# Patient Record
Sex: MALE | Race: WHITE | NOT HISPANIC OR LATINO | URBAN - METROPOLITAN AREA
[De-identification: names, ages, dates, MRNs, and addresses within clinical notes are randomized per-mention and may not be internally consistent; named-entity substitution may affect disease eponyms.]

---

## 2019-12-21 ENCOUNTER — EMERGENCY (EMERGENCY)
Facility: HOSPITAL | Age: 34
LOS: 1 days | Discharge: HOME | End: 2019-12-21
Attending: INTERNAL MEDICINE | Admitting: INTERNAL MEDICINE
Payer: MEDICAID

## 2019-12-21 VITALS
HEIGHT: 71 IN | DIASTOLIC BLOOD PRESSURE: 96 MMHG | HEART RATE: 96 BPM | TEMPERATURE: 98 F | OXYGEN SATURATION: 98 % | WEIGHT: 160.06 LBS | SYSTOLIC BLOOD PRESSURE: 149 MMHG | RESPIRATION RATE: 20 BRPM

## 2019-12-21 LAB
APPEARANCE UR: CLEAR — SIGNIFICANT CHANGE UP
BILIRUB UR-MCNC: NEGATIVE — SIGNIFICANT CHANGE UP
COLOR SPEC: YELLOW — SIGNIFICANT CHANGE UP
DIFF PNL FLD: ABNORMAL
GLUCOSE UR QL: NEGATIVE MG/DL — SIGNIFICANT CHANGE UP
KETONES UR-MCNC: ABNORMAL
LEUKOCYTE ESTERASE UR-ACNC: ABNORMAL
NITRITE UR-MCNC: NEGATIVE — SIGNIFICANT CHANGE UP
PH UR: 7 — SIGNIFICANT CHANGE UP (ref 5–8)
PROT UR-MCNC: 15 MG/DL
RBC CASTS # UR COMP ASSIST: ABNORMAL /HPF (ref 0–4)
SP GR SPEC: 1.01 — SIGNIFICANT CHANGE UP (ref 1.01–1.02)
UROBILINOGEN FLD QL: 4 MG/DL
WBC UR QL: SIGNIFICANT CHANGE UP

## 2019-12-21 PROCEDURE — 71045 X-RAY EXAM CHEST 1 VIEW: CPT

## 2019-12-21 PROCEDURE — 74019 RADEX ABDOMEN 2 VIEWS: CPT | Mod: 26

## 2019-12-21 PROCEDURE — 99283 EMERGENCY DEPT VISIT LOW MDM: CPT

## 2019-12-21 PROCEDURE — 71045 X-RAY EXAM CHEST 1 VIEW: CPT | Mod: 26

## 2019-12-21 PROCEDURE — 99284 EMERGENCY DEPT VISIT MOD MDM: CPT | Mod: 25

## 2019-12-21 PROCEDURE — 81001 URINALYSIS AUTO W/SCOPE: CPT

## 2019-12-21 PROCEDURE — 74019 RADEX ABDOMEN 2 VIEWS: CPT

## 2019-12-21 NOTE — ED PROVIDER NOTE - SIGNIFICANT NEGATIVE FINDINGS
no headache, no chest pain, no Syncope , no palpitations, no n/v/d, no urinary symptoms, no GI  bleeding. no neuro changes.

## 2019-12-21 NOTE — ED PROVIDER NOTE - OBJECTIVE STATEMENT
33 y/o man who appears anxious and has a couple of complaints, he feels SOB, anxious and found to have an elevated BP, No chest pain, no cough, no fever, no chills, no acute stroke symptoms. Also feeling abdominal discomfort and believes that he has constipation, for which he has seen his PMD for in the past , no nausea, no vomiting, no urinary symptoms, no GIB.  He refusing lab analysis, BP medication but he will allow x ray and u/a as part of his evaluation. He drove himself but also declines a relaxant

## 2019-12-21 NOTE — ED ADULT NURSE NOTE - NSIMPLEMENTINTERV_GEN_ALL_ED
Implemented All Universal Safety Interventions:  [place] to call system. Call bell, personal items and telephone within reach. Instruct patient to call for assistance. Room bathroom lighting operational. Non-slip footwear when patient is off stretcher. Physically safe environment: no spills, clutter or unnecessary equipment. Stretcher in lowest position, wheels locked, appropriate side rails in place.

## 2019-12-21 NOTE — ED PROVIDER NOTE - PATIENT PORTAL LINK FT
You can access the FollowMyHealth Patient Portal offered by [facility] by registering at the following website: [email or web address]. By joining [facility]’s FollowMyHealth portal, you will also be able to view your health information using other applications (apps) compatible with our system.

## 2019-12-21 NOTE — ED PROVIDER NOTE - PROVIDER TOKENS
PROVIDER:[TOKEN:[[id number]],FOLLOWUP:[1-3 Days]],PROVIDER:[TOKEN:[[id number]],FOLLOWUP:[1-3 Days]]

## 2019-12-21 NOTE — ED PROVIDER NOTE - CARE PLAN
Principal Discharge DX:	Anxiety Principal Discharge DX:	Anxiety  Secondary Diagnosis:	HTN (hypertension)  Secondary Diagnosis:	Hematuria

## 2019-12-21 NOTE — ED PROVIDER NOTE - NSFOLLOWUPINSTRUCTIONS_ED_ALL_ED_FT
We found your blood pressure to be elevated , this can be due to anxiety. You need to have this checked, your were given the name of a cardiologist    we found blood in the urine this needs to be checked by your doctor or a urologist a referral was given

## 2019-12-21 NOTE — ED PROVIDER NOTE - CARE PROVIDER_API CALL
Nate Avendaño)  Cardiovascular Disease; Internal Medicine  [address] [place] [address]  Phone: [phone]  Fax: [phone]  Follow Up Time: 1-3 Days    Lionel Laureano)  Urology  [address], [address]  [place] [address]  Phone: [phone]  Fax: [phone]  Follow Up Time: 1-3 Days

## 2019-12-21 NOTE — ED PROVIDER NOTE - CLINICAL SUMMARY MEDICAL DECISION MAKING FREE TEXT BOX
patient is anxious, dyspnea and elevated BP, abdominal discomfort and h/o constipation , consents to general exam, no blood test   plan UA xrays and referral to urology and cardiology for BP MGT

## 2019-12-21 NOTE — ED PROVIDER NOTE - CARE PROVIDERS DIRECT ADDRESSES
[email or web address]

## 2020-01-01 ENCOUNTER — OUTPATIENT (OUTPATIENT)
Dept: OUTPATIENT SERVICES | Facility: HOSPITAL | Age: 35
LOS: 1 days | End: 2020-01-01
Payer: MEDICAID

## 2020-01-01 PROCEDURE — G9001: CPT

## 2020-01-03 DIAGNOSIS — Z71.89 OTHER SPECIFIED COUNSELING: ICD-10-CM

## 2020-02-06 ENCOUNTER — TRANSCRIPTION ENCOUNTER (OUTPATIENT)
Age: 35
End: 2020-02-06

## 2020-02-10 ENCOUNTER — OUTPATIENT (OUTPATIENT)
Dept: OUTPATIENT SERVICES | Facility: HOSPITAL | Age: 35
LOS: 1 days | Discharge: STILL ADMITTED OR ADMINISTRATIVE | End: 2020-02-10

## 2020-02-14 ENCOUNTER — TRANSCRIPTION ENCOUNTER (OUTPATIENT)
Age: 35
End: 2020-02-14

## 2023-03-08 PROBLEM — Z00.00 ENCOUNTER FOR PREVENTIVE HEALTH EXAMINATION: Status: ACTIVE | Noted: 2023-03-08

## 2023-03-13 ENCOUNTER — NON-APPOINTMENT (OUTPATIENT)
Age: 38
End: 2023-03-13

## 2023-03-13 ENCOUNTER — APPOINTMENT (OUTPATIENT)
Dept: COLORECTAL SURGERY | Facility: CLINIC | Age: 38
End: 2023-03-13
Payer: MEDICAID

## 2023-03-13 VITALS
WEIGHT: 245 LBS | TEMPERATURE: 98.7 F | BODY MASS INDEX: 34.3 KG/M2 | HEART RATE: 81 BPM | RESPIRATION RATE: 14 BRPM | HEIGHT: 71 IN | SYSTOLIC BLOOD PRESSURE: 116 MMHG | DIASTOLIC BLOOD PRESSURE: 78 MMHG

## 2023-03-13 PROCEDURE — 46221 LIGATION OF HEMORRHOID(S): CPT

## 2023-03-13 PROCEDURE — 99204 OFFICE O/P NEW MOD 45 MIN: CPT | Mod: 25

## 2023-03-13 NOTE — HISTORY OF PRESENT ILLNESS
[FreeTextEntry1] : Mr. De Paz presents to the office for consultation secondary to a history of intermittent rectal bleeding.  He notes that this has been evaluated in the past, approximately July 2020, with a flexible sigmoidoscopy.  He was reassured at that time that the bleeding is likely from a more concerning source.  At that time, he was also prescribed hemorrhoidal creams.  Since that time, he continues to note rectal bleeding at least once a week with bowel movements.  His stools are described as passed on a daily basis, without straining and of a soft consistency.  He has occasional hemorrhoidal burning and itching.  He denies worsening frequency of bleeding or amount of bleeding overall.  He did not pursue complete colonoscopy in 2020 secondary to concerns for COVID as well as some difficulties with having someone drive him to and from the facilities.  Here for further evaluation and treatment.

## 2023-03-13 NOTE — ASSESSMENT
[FreeTextEntry1] : Mr. De Paz presents to the office for consultation secondary to intermittent rectal bleeding.  He had a diagnostic flexible sigmoidoscopy approximately 3 years earlier for this rectal bleeding, and was advised that the source was likely hemorrhoidal in nature.  Since that time, he continues to have relatively similar frequency and volume of rectal bleeding.  BREANNA and anoscopy in office today revealed full grade 2 internal hemorrhoids.  As he has never had rubber band ligation in the past, we proceeded to perform this to his left lateral column at today's office visit.  He was advised on what to expect postprocedure and should follow-up in 2 to 3 weeks for additional ligations.  Should he continue to have ongoing rectal bleeding despite rubber band ligations, we would then proceed with diagnostic colonoscopy.  Patient understands, and is agreeable with plan of care.

## 2023-03-13 NOTE — PHYSICAL EXAM
[Normal rectal exam] : exam was normal [Reduce Spontaneously] : a spontaneously reducible (grade II) [Normal] : was normal [None] : there was no rectal mass  [No Rash or Lesion] : No rash or lesion [Alert] : alert [Oriented to Person] : oriented to person [Oriented to Place] : oriented to place [Oriented to Time] : oriented to time [Calm] : calm [Skin Tags] : there were no residual hemorrhoidal skin tags seen [Gross Blood] : no gross blood [de-identified] : full Grade 2 [de-identified] : No apparent distress [de-identified] : Normocephalic atraumatic [de-identified] : Moving all extremities x4

## 2023-03-28 ENCOUNTER — APPOINTMENT (OUTPATIENT)
Dept: UROLOGY | Facility: CLINIC | Age: 38
End: 2023-03-28

## 2023-04-03 ENCOUNTER — APPOINTMENT (OUTPATIENT)
Dept: COLORECTAL SURGERY | Facility: CLINIC | Age: 38
End: 2023-04-03
Payer: MEDICAID

## 2023-04-03 VITALS
RESPIRATION RATE: 14 BRPM | SYSTOLIC BLOOD PRESSURE: 121 MMHG | WEIGHT: 245 LBS | DIASTOLIC BLOOD PRESSURE: 74 MMHG | TEMPERATURE: 98 F | HEIGHT: 71 IN | HEART RATE: 71 BPM | BODY MASS INDEX: 34.3 KG/M2

## 2023-04-03 PROCEDURE — 46221 LIGATION OF HEMORRHOID(S): CPT

## 2023-04-03 PROCEDURE — 99214 OFFICE O/P EST MOD 30 MIN: CPT | Mod: 25

## 2023-04-03 NOTE — HISTORY OF PRESENT ILLNESS
[FreeTextEntry1] : Mr. De Paz presents to the office for consultation secondary to a history of intermittent rectal bleeding.  He notes that this has been evaluated in the past, approximately July 2020, with a flexible sigmoidoscopy.  He was reassured at that time that the bleeding is likely from a more concerning source.  At that time, he was also prescribed hemorrhoidal creams.  Since that time, he continues to note rectal bleeding at least once a week with bowel movements.  His stools are described as passed on a daily basis, without straining and of a soft consistency.  He has occasional hemorrhoidal burning and itching.  He denies worsening frequency of bleeding or amount of bleeding overall.  He did not pursue complete colonoscopy in 2020 secondary to concerns for COVID as well as some difficulties with having someone drive him to and from the facilities.  Here for further evaluation and treatment.\par \par 4/3/23 Here for followup of bleeding internal hemorrhoids.\par Overall, bleeding is improved, but persists to some degree. BMs continue to be passed without issue.

## 2023-04-03 NOTE — PHYSICAL EXAM
[Normal rectal exam] : exam was normal [Reduce Spontaneously] : a spontaneously reducible (grade II) [Normal] : was normal [None] : there was no rectal mass  [No Rash or Lesion] : No rash or lesion [Alert] : alert [Oriented to Person] : oriented to person [Oriented to Place] : oriented to place [Oriented to Time] : oriented to time [Calm] : calm [Skin Tags] : there were no residual hemorrhoidal skin tags seen [Gross Blood] : no gross blood [de-identified] : full Grade 2 [de-identified] : Normocephalic atraumatic [de-identified] : No apparent distress [de-identified] : Moving all extremities x4

## 2023-04-03 NOTE — ASSESSMENT
[FreeTextEntry1] : Mr. De Paz presents to the office for consultation secondary to intermittent rectal bleeding.  He had a diagnostic flexible sigmoidoscopy approximately 3 years earlier for this rectal bleeding, and was advised that the source was likely hemorrhoidal in nature.  Since that time, he continues to have relatively similar frequency and volume of rectal bleeding.  BREANNA and anoscopy in office today revealed full grade 2 internal hemorrhoids.  As he has never had rubber band ligation in the past, we proceeded to perform this to his left lateral column at today's office visit.  He was advised on what to expect postprocedure and should follow-up in 2 to 3 weeks for additional ligations.  Should he continue to have ongoing rectal bleeding despite rubber band ligations, we would then proceed with diagnostic colonoscopy.  Patient understands, and is agreeable with plan of care.\par \par 4/3/23 Mr. De Paz returns to the office for followup.  He underwent another rubber band ligation for ongoing, but improved rectal bleeding.  He was advised to follow-up in 3 weeks time for another ligation, and at that visit, we will likely schedule him for colonoscopy if bleeding does not improve.  Patient understands, and is agreeable.

## 2023-04-20 ENCOUNTER — APPOINTMENT (OUTPATIENT)
Dept: UROLOGY | Facility: CLINIC | Age: 38
End: 2023-04-20
Payer: MEDICAID

## 2023-04-20 VITALS
WEIGHT: 245 LBS | BODY MASS INDEX: 34.3 KG/M2 | OXYGEN SATURATION: 97 % | SYSTOLIC BLOOD PRESSURE: 111 MMHG | RESPIRATION RATE: 16 BRPM | HEART RATE: 65 BPM | DIASTOLIC BLOOD PRESSURE: 74 MMHG | HEIGHT: 71 IN

## 2023-04-20 DIAGNOSIS — R79.89 OTHER SPECIFIED ABNORMAL FINDINGS OF BLOOD CHEMISTRY: ICD-10-CM

## 2023-04-20 PROCEDURE — 99204 OFFICE O/P NEW MOD 45 MIN: CPT

## 2023-04-20 NOTE — PHYSICAL EXAM
[General Appearance - Well Developed] : well developed [General Appearance - Well Nourished] : well nourished [Normal Appearance] : normal appearance [Well Groomed] : well groomed [General Appearance - In No Acute Distress] : no acute distress [Edema] : no peripheral edema [Respiration, Rhythm And Depth] : normal respiratory rhythm and effort [Exaggerated Use Of Accessory Muscles For Inspiration] : no accessory muscle use [Abdomen Soft] : soft [Abdomen Tenderness] : non-tender [Costovertebral Angle Tenderness] : no ~M costovertebral angle tenderness [Urethral Meatus] : meatus normal [Urinary Bladder Findings] : the bladder was normal on palpation [Scrotum] : the scrotum was normal [Testes Mass (___cm)] : there were no testicular masses [No Prostate Nodules] : no prostate nodules [FreeTextEntry1] : Normal male genitalia. The prostate gland is small to moderate size and palpably benign.  [Normal Station and Gait] : the gait and station were normal for the patient's age [] : no rash [No Focal Deficits] : no focal deficits [Oriented To Time, Place, And Person] : oriented to person, place, and time [Affect] : the affect was normal [Mood] : the mood was normal [Not Anxious] : not anxious [No Palpable Adenopathy] : no palpable adenopathy

## 2023-04-20 NOTE — END OF VISIT
[FreeTextEntry3] : Serum studies were sent which include a PSA, FSH, LSH, prolactin, and T. A urine was sent as well. If his T is indeed low, he will be called for supplimentation and instructions in the future.

## 2023-04-20 NOTE — HISTORY OF PRESENT ILLNESS
[FreeTextEntry1] : 37M presents today with a CC of low T. He was found to have a T of 214. He states that he has had sonograph of the bladder which was normal. He also states that his urine analysis was normal as well.

## 2023-04-20 NOTE — REVIEW OF SYSTEMS
[Dry Eyes] : dryness of the eyes [Constipation] : constipation [Diarrhea] : diarrhea [Heartburn] : heartburn [Itching] : itching [Anxiety] : anxiety [see HPI] : see HPI [Negative] : Psychiatric

## 2023-04-21 ENCOUNTER — APPOINTMENT (OUTPATIENT)
Dept: COLORECTAL SURGERY | Facility: CLINIC | Age: 38
End: 2023-04-21
Payer: MEDICAID

## 2023-04-21 VITALS
HEIGHT: 71 IN | TEMPERATURE: 96.5 F | DIASTOLIC BLOOD PRESSURE: 73 MMHG | SYSTOLIC BLOOD PRESSURE: 114 MMHG | BODY MASS INDEX: 34.3 KG/M2 | HEART RATE: 83 BPM | WEIGHT: 245 LBS

## 2023-04-21 DIAGNOSIS — K62.5 HEMORRHAGE OF ANUS AND RECTUM: ICD-10-CM

## 2023-04-21 DIAGNOSIS — K64.8 OTHER HEMORRHOIDS: ICD-10-CM

## 2023-04-21 PROCEDURE — 99214 OFFICE O/P EST MOD 30 MIN: CPT | Mod: 25

## 2023-04-21 PROCEDURE — 46221 LIGATION OF HEMORRHOID(S): CPT

## 2023-04-21 NOTE — ASSESSMENT
[FreeTextEntry1] : Mr. De Paz presents to the office for consultation secondary to intermittent rectal bleeding.  He had a diagnostic flexible sigmoidoscopy approximately 3 years earlier for this rectal bleeding, and was advised that the source was likely hemorrhoidal in nature.  Since that time, he continues to have relatively similar frequency and volume of rectal bleeding.  BREANNA and anoscopy in office today revealed full grade 2 internal hemorrhoids.  As he has never had rubber band ligation in the past, we proceeded to perform this to his left lateral column at today's office visit.  He was advised on what to expect postprocedure and should follow-up in 2 to 3 weeks for additional ligations.  Should he continue to have ongoing rectal bleeding despite rubber band ligations, we would then proceed with diagnostic colonoscopy.  Patient understands, and is agreeable with plan of care.\par \par 4/3/23 Mr. De Paz returns to the office for followup.  He underwent another rubber band ligation for ongoing, but improved rectal bleeding.  He was advised to follow-up in 3 weeks time for another ligation, and at that visit, we will likely schedule him for colonoscopy if bleeding does not improve.  Patient understands, and is agreeable.\par \par 4/21/23 Mr. De Paz returns to the office for followup. In office today, we proceeded to rubber band ligate his posterior column to good effect.  Given overall improvement in rectal bleeding, he does not have to follow-up for routine ligations and can currently hold off on diagnostic colonoscopy.  Patient understands, and is agreeable.

## 2023-04-21 NOTE — PHYSICAL EXAM
[Normal rectal exam] : exam was normal [Reduce Spontaneously] : a spontaneously reducible (grade II) [Normal] : was normal [None] : there was no rectal mass  [No Rash or Lesion] : No rash or lesion [Alert] : alert [Oriented to Person] : oriented to person [Oriented to Place] : oriented to place [Oriented to Time] : oriented to time [Calm] : calm [Skin Tags] : there were no residual hemorrhoidal skin tags seen [Gross Blood] : no gross blood [de-identified] : full Grade 2 [de-identified] : No apparent distress [de-identified] : Normocephalic atraumatic [de-identified] : Moving all extremities x4

## 2023-04-21 NOTE — PHYSICAL EXAM
[Normal rectal exam] : exam was normal [Reduce Spontaneously] : a spontaneously reducible (grade II) [Normal] : was normal [None] : there was no rectal mass  [No Rash or Lesion] : No rash or lesion [Alert] : alert [Oriented to Person] : oriented to person [Oriented to Place] : oriented to place [Oriented to Time] : oriented to time [Calm] : calm [Skin Tags] : there were no residual hemorrhoidal skin tags seen [Gross Blood] : no gross blood [de-identified] : full Grade 2 [de-identified] : No apparent distress [de-identified] : Normocephalic atraumatic [de-identified] : Moving all extremities x4

## 2023-04-21 NOTE — HISTORY OF PRESENT ILLNESS
[FreeTextEntry1] : Mr. De Paz presents to the office for consultation secondary to a history of intermittent rectal bleeding.  He notes that this has been evaluated in the past, approximately July 2020, with a flexible sigmoidoscopy.  He was reassured at that time that the bleeding is likely from a more concerning source.  At that time, he was also prescribed hemorrhoidal creams.  Since that time, he continues to note rectal bleeding at least once a week with bowel movements.  His stools are described as passed on a daily basis, without straining and of a soft consistency.  He has occasional hemorrhoidal burning and itching.  He denies worsening frequency of bleeding or amount of bleeding overall.  He did not pursue complete colonoscopy in 2020 secondary to concerns for COVID as well as some difficulties with having someone drive him to and from the facilities.  Here for further evaluation and treatment.\par \par 4/3/23 Here for followup of bleeding internal hemorrhoids.\par Overall, bleeding is improved, but persists to some degree. BMs continue to be passed without issue.\par \par 4/21/23 Mr. De Paz returns to the office for follow-up.  Since his last office appointment, he reports an overall improvement in rectal bleeding.  Here for one last ligation in a series of 3.

## 2023-04-22 LAB
APPEARANCE: CLEAR
BACTERIA: NEGATIVE /HPF
BILIRUBIN URINE: NEGATIVE
BLOOD URINE: NEGATIVE
CAST: 0 /LPF
COLOR: YELLOW
EPITHELIAL CELLS: 0 /HPF
FSH SERPL-MCNC: 1.3 IU/L
GLUCOSE QUALITATIVE U: NEGATIVE MG/DL
KETONES URINE: NEGATIVE MG/DL
LEUKOCYTE ESTERASE URINE: NEGATIVE
LH SERPL-ACNC: 2.2 IU/L
MICROSCOPIC-UA: NORMAL
NITRITE URINE: NEGATIVE
PH URINE: 5.5
PROLACTIN SERPL-MCNC: 10 NG/ML
PROTEIN URINE: NEGATIVE MG/DL
PSA SERPL-MCNC: 0.47 NG/ML
RED BLOOD CELLS URINE: 1 /HPF
SPECIFIC GRAVITY URINE: 1.02
TESTOST SERPL-MCNC: 330 NG/DL
UROBILINOGEN URINE: 0.2 MG/DL
WHITE BLOOD CELLS URINE: 1 /HPF

## 2023-04-24 LAB — URINE CYTOLOGY: NORMAL

## 2023-07-07 ENCOUNTER — NON-APPOINTMENT (OUTPATIENT)
Age: 38
End: 2023-07-07

## 2024-02-07 ENCOUNTER — APPOINTMENT (OUTPATIENT)
Dept: OTOLARYNGOLOGY | Facility: CLINIC | Age: 39
End: 2024-02-07

## 2024-02-27 ENCOUNTER — APPOINTMENT (OUTPATIENT)
Dept: PULMONOLOGY | Facility: CLINIC | Age: 39
End: 2024-02-27

## 2024-03-15 ENCOUNTER — APPOINTMENT (OUTPATIENT)
Dept: OTOLARYNGOLOGY | Facility: CLINIC | Age: 39
End: 2024-03-15

## 2024-03-20 ENCOUNTER — APPOINTMENT (OUTPATIENT)
Dept: COLORECTAL SURGERY | Facility: CLINIC | Age: 39
End: 2024-03-20
Payer: MEDICAID

## 2024-03-20 VITALS — HEIGHT: 71 IN | BODY MASS INDEX: 35.14 KG/M2 | WEIGHT: 251 LBS

## 2024-03-20 DIAGNOSIS — Z86.39 PERSONAL HISTORY OF OTHER ENDOCRINE, NUTRITIONAL AND METABOLIC DISEASE: ICD-10-CM

## 2024-03-20 DIAGNOSIS — R15.9 FULL INCONTINENCE OF FECES: ICD-10-CM

## 2024-03-20 PROCEDURE — 46600 DIAGNOSTIC ANOSCOPY SPX: CPT

## 2024-03-20 PROCEDURE — 99203 OFFICE O/P NEW LOW 30 MIN: CPT | Mod: 25

## 2024-03-20 NOTE — HISTORY OF PRESENT ILLNESS
[FreeTextEntry1] : 38-year-old male who presents for evaluation of occasional fecal incontinence. Patient reports noticing fecal material in the perianal region every 3-4 days. He mostly notices it towards the end of his day in the evening. Patient reports having a colonoscopy done for evaluation in December 2023. Per the patient, a large polyp was removed, and he was told he had hemorrhoids. Patient was referred to colorectal surgery for further evaluation of his symptoms. Patient denies any changes in his stool consistency, denies any anorectal bleeding or pain, reports being able to hold his stool in if he needs to, he denies any fecal urgency, denies any gross fecal incontinence.

## 2024-03-20 NOTE — ASSESSMENT
[FreeTextEntry1] : 38-year-old male who presents for evaluation of occasional fecal incontinence. No significant findings on BREANNA and anoscope. We discussed trial of stool bulking with increased dietary and supplemental fiber. During this period patient will monitor his symptoms for improvement. Should symptoms persist we will plan on further workup with imaging and anal manometry. Patient will follow up in 4 weeks.

## 2024-03-20 NOTE — PROCEDURE
[FreeTextEntry1] : Anoscopy  I discussed the reason for the procedure, and the risks and benefits with the patient. Risks including bleeding and discomfort were discussed. The patient understood or discussion and would like to proceed with the procedure.  After completing a digital rectal exam a well lubricated anoscope was gently inserted into the anus. Complete inspection was performed. No tenderness on insertion of the anoscope, and the procedure was tolerated well. No fissures, fistula openings, enlarged hemorrhoids or masses were identified.  No significant findings.

## 2024-03-20 NOTE — PHYSICAL EXAM
[Excoriation] : no perianal excoriation [Multiple Sinus Tracts] : no perianal sinus tracts [Fistula] : no fistulas [Pilonidal Cyst] : no pilonidal cysts [Wart] : no warts [Pilonidal Sinus] : no pilonidal sinus [Tender, Swollen] : nontender, non-swollen [Lax] : was lax [None] : there was no rectal abscess [Alert] : alert [Oriented to Person] : oriented to person [Oriented to Place] : oriented to place [Oriented to Time] : oriented to time [Calm] : calm [de-identified] : BREANNA: Nontender, no gross blood, no enlarged masses, no enlarged hemorrhoids [de-identified] : Normal perianal exam, minor fecal soilage [de-identified] : Sphincter tone normal to slightly lax [de-identified] : Nonlabored breathing, [de-identified] : NAD [de-identified] : Normal rate

## 2024-03-21 ENCOUNTER — TRANSCRIPTION ENCOUNTER (OUTPATIENT)
Age: 39
End: 2024-03-21

## 2024-05-09 ENCOUNTER — APPOINTMENT (OUTPATIENT)
Dept: COLORECTAL SURGERY | Facility: CLINIC | Age: 39
End: 2024-05-09

## 2024-05-13 ENCOUNTER — APPOINTMENT (OUTPATIENT)
Dept: GASTROENTEROLOGY | Facility: CLINIC | Age: 39
End: 2024-05-13

## 2024-06-03 ENCOUNTER — OFFICE (OUTPATIENT)
Dept: URBAN - METROPOLITAN AREA CLINIC 109 | Facility: CLINIC | Age: 39
Setting detail: OPHTHALMOLOGY
End: 2024-06-03
Payer: COMMERCIAL

## 2024-06-03 DIAGNOSIS — H10.33: ICD-10-CM

## 2024-06-03 PROCEDURE — 99203 OFFICE O/P NEW LOW 30 MIN: CPT | Performed by: OPHTHALMOLOGY

## 2024-06-03 ASSESSMENT — CONFRONTATIONAL VISUAL FIELD TEST (CVF)
OD_FINDINGS: FULL
OS_FINDINGS: FULL

## 2025-03-24 DIAGNOSIS — K63.5 POLYP OF COLON: ICD-10-CM

## 2025-06-20 PROBLEM — D13.91 POLYPOSIS SYNDROME, FAMILIAL: Status: ACTIVE | Noted: 2025-06-20

## 2025-07-28 ENCOUNTER — OUTPATIENT (OUTPATIENT)
Dept: OUTPATIENT SERVICES | Facility: HOSPITAL | Age: 40
LOS: 1 days | Discharge: HOME | End: 2025-07-28
Payer: COMMERCIAL

## 2025-07-28 ENCOUNTER — RESULT REVIEW (OUTPATIENT)
Age: 40
End: 2025-07-28

## 2025-07-28 ENCOUNTER — APPOINTMENT (OUTPATIENT)
Dept: GASTROENTEROLOGY | Facility: HOSPITAL | Age: 40
End: 2025-07-28

## 2025-07-28 VITALS
HEART RATE: 89 BPM | RESPIRATION RATE: 18 BRPM | TEMPERATURE: 98 F | HEIGHT: 71 IN | OXYGEN SATURATION: 99 % | SYSTOLIC BLOOD PRESSURE: 141 MMHG | WEIGHT: 250 LBS | DIASTOLIC BLOOD PRESSURE: 97 MMHG

## 2025-07-28 DIAGNOSIS — E66.9 OBESITY, UNSPECIFIED: ICD-10-CM

## 2025-07-28 DIAGNOSIS — D12.5 BENIGN NEOPLASM OF SIGMOID COLON: ICD-10-CM

## 2025-07-28 DIAGNOSIS — K21.9 GASTRO-ESOPHAGEAL REFLUX DISEASE WITHOUT ESOPHAGITIS: ICD-10-CM

## 2025-07-28 DIAGNOSIS — K21.00 GASTRO-ESOPHAGEAL REFLUX DISEASE WITH ESOPHAGITIS, WITHOUT BLEEDING: ICD-10-CM

## 2025-07-28 DIAGNOSIS — Z12.11 ENCOUNTER FOR SCREENING FOR MALIGNANT NEOPLASM OF COLON: ICD-10-CM

## 2025-07-28 DIAGNOSIS — Z86.0100 PERSONAL HISTORY OF COLON POLYPS, UNSPECIFIED: ICD-10-CM

## 2025-07-28 DIAGNOSIS — K44.9 DIAPHRAGMATIC HERNIA WITHOUT OBSTRUCTION OR GANGRENE: ICD-10-CM

## 2025-07-28 DIAGNOSIS — K63.5 POLYP OF COLON: ICD-10-CM

## 2025-07-28 DIAGNOSIS — G47.33 OBSTRUCTIVE SLEEP APNEA (ADULT) (PEDIATRIC): ICD-10-CM

## 2025-07-28 DIAGNOSIS — K63.89 OTHER SPECIFIED DISEASES OF INTESTINE: ICD-10-CM

## 2025-07-28 PROCEDURE — 88342 IMHCHEM/IMCYTCHM 1ST ANTB: CPT

## 2025-07-28 PROCEDURE — C1889: CPT

## 2025-07-28 PROCEDURE — 43239 EGD BIOPSY SINGLE/MULTIPLE: CPT | Mod: 59

## 2025-07-28 PROCEDURE — 88313 SPECIAL STAINS GROUP 2: CPT

## 2025-07-28 PROCEDURE — 88313 SPECIAL STAINS GROUP 2: CPT | Mod: 26

## 2025-07-28 PROCEDURE — 88305 TISSUE EXAM BY PATHOLOGIST: CPT

## 2025-07-28 PROCEDURE — 88341 IMHCHEM/IMCYTCHM EA ADD ANTB: CPT | Mod: 26

## 2025-07-28 PROCEDURE — 88342 IMHCHEM/IMCYTCHM 1ST ANTB: CPT | Mod: 26

## 2025-07-28 PROCEDURE — 88341 IMHCHEM/IMCYTCHM EA ADD ANTB: CPT

## 2025-07-28 PROCEDURE — 88305 TISSUE EXAM BY PATHOLOGIST: CPT | Mod: 26

## 2025-07-28 PROCEDURE — 45385 COLONOSCOPY W/LESION REMOVAL: CPT

## 2025-07-29 LAB — SURGICAL PATHOLOGY STUDY: SIGNIFICANT CHANGE UP

## 2025-08-01 DIAGNOSIS — K20.90 ESOPHAGITIS, UNSPECIFIED WITHOUT BLEEDING: ICD-10-CM

## 2025-08-01 RX ORDER — OMEPRAZOLE 40 MG/1
40 CAPSULE, DELAYED RELEASE ORAL
Qty: 30 | Refills: 3 | Status: ACTIVE | COMMUNITY
Start: 2025-08-01 | End: 1900-01-01